# Patient Record
Sex: FEMALE | Race: WHITE | NOT HISPANIC OR LATINO
[De-identification: names, ages, dates, MRNs, and addresses within clinical notes are randomized per-mention and may not be internally consistent; named-entity substitution may affect disease eponyms.]

---

## 2024-09-13 ENCOUNTER — APPOINTMENT (OUTPATIENT)
Dept: HEMATOLOGY ONCOLOGY | Facility: CLINIC | Age: 89
End: 2024-09-13

## 2024-09-13 NOTE — DISCUSSION/SUMMARY
[FreeTextEntry1] : REASON FOR CONSULT Lenka Carranza is a 89-year-old female who was self-referred for cancer genetic counseling and risk assessment due to a family history of cancer. She was accompanied by her daughter, Jennifer.   RELEVANT MEDICAL HISTORY Ms. Carranza is a healthy individual who has never had cancer. She has a family history of cancer, see below.  Of note, Ms. Carranza's daughter, Jennifer, had genetic testing using [employer] Custom panel (25 genes) which was NEGATIVE. This testing was ordered by Dr. Arley Simons and reported on 2024.   OTHER MEDICAL AND SURGICAL HISTORY: -	Medical History: None.  -	Surgical History: Breast reduction, exploratory thyroid surgery, knee surgery, shoulder surgery, arthritic surgery.   PAST OB/GYN HISTORY: Height:  5'1" Weight: 165 lbs Obstetrical History:  Age at Menarche: 14 Menopausal with LMP at age 51 Age at First Live Birth: 24 Oral Contraceptive Use: No Hormone Replacement Therapy: No.   CANCER SCREENING HISTORY:   Breast:  -	Mammography: most recent reported in : Negative; Frequency: Annual -	Sonography: None.  -	MRI: None.  -	Biopsies: None.  GYN: -	Pelvic Examination & Pap Smear: most recent reported in : Negative; Frequency: Annual Colon: -	Colonoscopy: most recent reported 4 years ago: No polyps; Frequency: Unclear Reportedly 5 or more colonoscopies: Reportedly no polyps.  -	Upper Endoscopy: most recent reported 2-3 years ago: Blebs in upper GI tract dx at age 76; Frequency: None.  Skin:   -	FBSE: most recent reported more than 10 years ago: Negative -	Lesions biopsied/removed: None.   SOCIAL HISTORY: -	Tobacco-product use: Yes, former, 10 years.  -	Environmental exposures (like asbestos/toxic chemicals/radiation): None.   FAMILY HISTORY: Maternal and paternal ancestry was reported [place]. A detailed family history of cancer was ascertained. Relevant diagnoses are detailed below and in the scanned pedigree.  	 RISK ASSESSMENT: Ms. Carranza's family history of pancreatic and breast cancer is suggestive of an inherited predisposition to pancreatic, breast and related cancers.   We recommended genetic testing for genes associated with pancreatic, breast and gynecological cancer. This test analyzes 25 genes: APC, KISHA, BARD1, BRCA1, BRCA2, BRIP1, CDH1, CDKN2A, CHEK2, EPCAM, MEN1, MLH1, MSH2, MSH6, NF1, PALB2, PMS2, PTEN, RAD51C, RAD51D, STK11, TP53, TSC1, TSC2, VHL.   We discussed the risks, benefits and limitations, and implications of genetic testing. We also discussed the psychosocial implications of genetic testing. Possible test results were reviewed with Ms. Carranza, along with associated medical management options. The Genetic Information Non-discrimination Act (WARNER) was also reviewed.   Ms. Carranza consented to the above-mentioned genetic testing panel. Blood was drawn in our laboratory and sent to [facility] today.  PLAN: 1.	Blood drawn today will be sent to [facility] for analysis.  2.	We will contact Ms. Carranza once the results are available and will schedule a follow-up appointment, as needed. Results generally return in 2-3 weeks from the day the sample is received in the lab. 3.	Ms. Carranza signed a medical release to allow discussion of her genetics information with her daughter, Jennifer. This will be scanned into her chart.  For any additional questions please call Cancer Genetics at [phone].   Galina Johnson MS, [facility] [place], [facility] Genetics

## 2025-03-18 ENCOUNTER — NON-APPOINTMENT (OUTPATIENT)
Age: 89
End: 2025-03-18